# Patient Record
Sex: FEMALE | Race: WHITE | Employment: OTHER | URBAN - METROPOLITAN AREA
[De-identification: names, ages, dates, MRNs, and addresses within clinical notes are randomized per-mention and may not be internally consistent; named-entity substitution may affect disease eponyms.]

---

## 2017-01-19 PROBLEM — H35.033 HYPERTENSIVE RETINOPATHY OF BOTH EYES, GRADE 1: Status: ACTIVE | Noted: 2017-01-19

## 2017-01-19 PROBLEM — H35.3222 EXUDATIVE AGE-RELATED MACULAR DEGENERATION OF LEFT EYE WITH INACTIVE CHOROIDAL NEOVASCULARIZATION (HCC): Status: ACTIVE | Noted: 2017-01-19

## 2017-01-23 PROBLEM — I51.89 LEFT VENTRICULAR DIASTOLIC DYSFUNCTION WITH PRESERVED SYSTOLIC FUNCTION: Status: ACTIVE | Noted: 2017-01-23

## 2017-01-31 ENCOUNTER — HOSPITAL ENCOUNTER (OUTPATIENT)
Dept: ULTRASOUND IMAGING | Facility: HOSPITAL | Age: 82
Discharge: HOME | End: 2017-01-31
Attending: INTERNAL MEDICINE
Payer: MEDICARE

## 2017-01-31 ENCOUNTER — HOSPITAL ENCOUNTER (OUTPATIENT)
Dept: GENERAL RADIOLOGY | Facility: HOSPITAL | Age: 82
Discharge: HOME | End: 2017-01-31
Attending: INTERNAL MEDICINE
Payer: MEDICARE

## 2017-01-31 VITALS
DIASTOLIC BLOOD PRESSURE: 91 MMHG | SYSTOLIC BLOOD PRESSURE: 163 MMHG | RESPIRATION RATE: 20 BRPM | HEART RATE: 73 BPM | OXYGEN SATURATION: 95 %

## 2017-01-31 DIAGNOSIS — J90 PLEURAL EFFUSION: ICD-10-CM

## 2017-01-31 DIAGNOSIS — J90 PLEURAL EFFUSION, RIGHT: ICD-10-CM

## 2017-01-31 LAB
LDH PLEURAL FLUID: 140 U/L
PROT FLD-MCNC: 5.1 G/DL

## 2017-01-31 PROCEDURE — 71010 XR CHEST AP/PA (1 VIEW) (CPT=71010): CPT

## 2017-01-31 PROCEDURE — 84311 SPECTROPHOTOMETRY: CPT

## 2017-01-31 PROCEDURE — 88112 CYTOPATH CELL ENHANCE TECH: CPT

## 2017-01-31 PROCEDURE — 88342 IMHCHEM/IMCYTCHM 1ST ANTB: CPT

## 2017-01-31 PROCEDURE — 89050 BODY FLUID CELL COUNT: CPT

## 2017-01-31 PROCEDURE — 88341 IMHCHEM/IMCYTCHM EA ADD ANTB: CPT

## 2017-01-31 PROCEDURE — 83615 LACTATE (LD) (LDH) ENZYME: CPT | Performed by: INTERNAL MEDICINE

## 2017-01-31 PROCEDURE — 88305 TISSUE EXAM BY PATHOLOGIST: CPT

## 2017-01-31 PROCEDURE — 87205 SMEAR GRAM STAIN: CPT

## 2017-01-31 PROCEDURE — 89051 BODY FLUID CELL COUNT: CPT

## 2017-01-31 PROCEDURE — 84157 ASSAY OF PROTEIN OTHER: CPT

## 2017-01-31 PROCEDURE — 87070 CULTURE OTHR SPECIMN AEROBIC: CPT

## 2017-01-31 PROCEDURE — 82570 ASSAY OF URINE CREATININE: CPT

## 2017-01-31 PROCEDURE — 32555 ASPIRATE PLEURA W/ IMAGING: CPT | Performed by: RADIOLOGY

## 2017-01-31 PROCEDURE — 88160 CYTOPATH SMEAR OTHER SOURCE: CPT

## 2017-01-31 NOTE — IMAGING NOTE
1315: PT PRESENTS WITH HER DAUGHTER-IN-LAW FOR ULTRASOUND GUIDED RIGHT THORACENTESIS. ASSISTED PT ONTO CART. PT IS ALERT AND ORIENTED. VITAL SIGNS STABLE.    CONSENT SIGNED   DR Chaka Bond PAGED VIA \"PAULINA\" AT [address] [address] [facility]

## 2017-02-01 LAB
BASOPHILS NFR FLD: 0 %
COLOR FLD: YELLOW
EOSINOPHIL NFR FLD: 0 %
LYMPHOCYTES NFR FLD: 21 %
MONOCYTES NFR FLD: 77 %
NEUTROPHILS NFR FLD: 1 %
RBC # FLD: 233 /CUMM (ref ?–1)
WBC # FLD: 833 /CUMM (ref ?–1)
WBC OTHER NFR FLD: 1 %

## 2017-02-02 PROBLEM — C45.0 MALIGNANT PLEURAL MESOTHELIOMA (HCC): Status: ACTIVE | Noted: 2017-02-02

## 2017-02-02 LAB — ADENOSINE DEAMINASE, PLEURAL FLUID: 2.5 U/L

## 2017-02-03 ENCOUNTER — HOSPITAL ENCOUNTER (OUTPATIENT)
Dept: NUCLEAR MEDICINE | Facility: HOSPITAL | Age: 82
Discharge: HOME | End: 2017-02-03
Attending: INTERNAL MEDICINE
Payer: MEDICARE

## 2017-02-03 DIAGNOSIS — C45.0 MESOTHELIOMA (PLEURAL) (HCC): ICD-10-CM

## 2017-02-03 LAB — GLUCOSE BLDC GLUCOMTR-MCNC: 111 MG/DL (ref 70–99)

## 2017-02-03 PROCEDURE — 82962 GLUCOSE BLOOD TEST: CPT

## 2017-02-03 PROCEDURE — 78815 PET IMAGE W/CT SKULL-THIGH: CPT

## 2017-02-07 PROBLEM — I25.10 CORONARY ARTERY CALCIFICATION: Status: ACTIVE | Noted: 2017-02-07

## 2017-02-07 PROBLEM — I25.84 CORONARY ARTERY CALCIFICATION: Status: ACTIVE | Noted: 2017-02-07

## 2017-02-21 ENCOUNTER — LAB ENCOUNTER (OUTPATIENT)
Dept: LAB | Age: 82
End: 2017-02-21
Attending: SURGERY
Payer: MEDICARE

## 2017-02-21 DIAGNOSIS — J90 EXUDATIVE PLEURISY: Primary | ICD-10-CM

## 2017-02-21 LAB
ANTIBODY SCREEN: NEGATIVE
RH BLOOD TYPE: POSITIVE

## 2017-02-21 PROCEDURE — 86850 RBC ANTIBODY SCREEN: CPT

## 2017-02-21 PROCEDURE — 86901 BLOOD TYPING SEROLOGIC RH(D): CPT

## 2017-02-21 PROCEDURE — 86900 BLOOD TYPING SEROLOGIC ABO: CPT

## 2017-03-07 ENCOUNTER — ANESTHESIA EVENT (OUTPATIENT)
Dept: CARDIAC SURGERY | Facility: HOSPITAL | Age: 82
DRG: 164 | End: 2017-03-07
Payer: MEDICARE

## 2017-03-08 ENCOUNTER — SURGERY (OUTPATIENT)
Age: 82
End: 2017-03-08

## 2017-03-08 ENCOUNTER — APPOINTMENT (OUTPATIENT)
Dept: GENERAL RADIOLOGY | Facility: HOSPITAL | Age: 82
DRG: 164 | End: 2017-03-08
Attending: SURGERY
Payer: MEDICARE

## 2017-03-08 ENCOUNTER — HOSPITAL ENCOUNTER (INPATIENT)
Facility: HOSPITAL | Age: 82
LOS: 4 days | Discharge: HOME HEALTH | DRG: 164 | End: 2017-03-12
Attending: SURGERY | Admitting: SURGERY
Payer: MEDICARE

## 2017-03-08 ENCOUNTER — ANESTHESIA (OUTPATIENT)
Dept: CARDIAC SURGERY | Facility: HOSPITAL | Age: 82
DRG: 164 | End: 2017-03-08
Payer: MEDICARE

## 2017-03-08 DIAGNOSIS — J90 PLEURAL EFFUSION, RIGHT: ICD-10-CM

## 2017-03-08 DIAGNOSIS — R94.30 CARDIAC LV EJECTION FRACTION >40%: ICD-10-CM

## 2017-03-08 DIAGNOSIS — Z98.61 S/P PTCA (PERCUTANEOUS TRANSLUMINAL CORONARY ANGIOPLASTY): ICD-10-CM

## 2017-03-08 DIAGNOSIS — Z96.1 PSEUDOPHAKIA: ICD-10-CM

## 2017-03-08 DIAGNOSIS — H35.033 HYPERTENSIVE RETINOPATHY OF BOTH EYES, GRADE 1: ICD-10-CM

## 2017-03-08 DIAGNOSIS — I25.84 CORONARY ARTERY CALCIFICATION: ICD-10-CM

## 2017-03-08 DIAGNOSIS — Z01.818 PREOP TESTING: ICD-10-CM

## 2017-03-08 DIAGNOSIS — H43.813 PVD (POSTERIOR VITREOUS DETACHMENT), BILATERAL: ICD-10-CM

## 2017-03-08 DIAGNOSIS — E78.2 MIXED HYPERLIPIDEMIA: Primary | ICD-10-CM

## 2017-03-08 DIAGNOSIS — H35.3222 EXUDATIVE AGE-RELATED MACULAR DEGENERATION OF LEFT EYE WITH INACTIVE CHOROIDAL NEOVASCULARIZATION (HCC): ICD-10-CM

## 2017-03-08 DIAGNOSIS — H90.3 BILATERAL SENSORINEURAL HEARING LOSS: ICD-10-CM

## 2017-03-08 DIAGNOSIS — IMO0001 AORTIC SCLEROSIS: ICD-10-CM

## 2017-03-08 DIAGNOSIS — J45.30 MILD PERSISTENT ASTHMA WITHOUT COMPLICATION: ICD-10-CM

## 2017-03-08 DIAGNOSIS — H93.13 TINNITUS, BILATERAL: ICD-10-CM

## 2017-03-08 DIAGNOSIS — I51.89 LEFT VENTRICULAR DIASTOLIC DYSFUNCTION WITH PRESERVED SYSTOLIC FUNCTION: ICD-10-CM

## 2017-03-08 DIAGNOSIS — I25.10 CORONARY ARTERY CALCIFICATION: ICD-10-CM

## 2017-03-08 DIAGNOSIS — Z95.5 PRESENCE OF BARE METAL STENT IN LAD CORONARY ARTERY: ICD-10-CM

## 2017-03-08 DIAGNOSIS — C45.0 MALIGNANT PLEURAL MESOTHELIOMA (HCC): ICD-10-CM

## 2017-03-08 DIAGNOSIS — I25.10 ATHEROSCLEROSIS OF NATIVE CORONARY ARTERY OF NATIVE HEART WITHOUT ANGINA PECTORIS: ICD-10-CM

## 2017-03-08 DIAGNOSIS — G47.33 OSA (OBSTRUCTIVE SLEEP APNEA): ICD-10-CM

## 2017-03-08 DIAGNOSIS — R42 DIZZINESS: ICD-10-CM

## 2017-03-08 DIAGNOSIS — Z66 DNR (DO NOT RESUSCITATE): ICD-10-CM

## 2017-03-08 DIAGNOSIS — R91.1 LUNG NODULE: ICD-10-CM

## 2017-03-08 PROBLEM — C45.9 MESOTHELIOMA (HCC): Status: ACTIVE | Noted: 2017-03-08

## 2017-03-08 PROBLEM — Z91.81 AT RISK FOR FALLING: Status: ACTIVE | Noted: 2017-03-08

## 2017-03-08 LAB
ALBUMIN SERPL-MCNC: 3.7 G/DL (ref 3.5–4.8)
ALP LIVER SERPL-CCNC: 125 U/L (ref 55–142)
ALT SERPL-CCNC: 22 U/L (ref 14–54)
ANTIBODY SCREEN: NEGATIVE
AST SERPL-CCNC: 25 U/L (ref 15–41)
ATRIAL RATE: 63 BPM
BILIRUB SERPL-MCNC: 0.4 MG/DL (ref 0.1–2)
BUN BLD-MCNC: 20 MG/DL (ref 8–20)
CALCIUM BLD-MCNC: 9.7 MG/DL (ref 8.3–10.3)
CHLORIDE: 100 MMOL/L (ref 101–111)
CO2: 33 MMOL/L (ref 22–32)
CREAT BLD-MCNC: 0.72 MG/DL (ref 0.55–1.02)
GLUCOSE BLD-MCNC: 110 MG/DL (ref 65–99)
GLUCOSE BLD-MCNC: 92 MG/DL (ref 70–99)
ISTAT ACTIVATED CLOTTING TIME: 147 SECONDS (ref 74–137)
ISTAT BLOOD GAS BASE EXCESS: 3 MMOL/L
ISTAT BLOOD GAS HCO3: 27.1 MEQ/L (ref 22–26)
ISTAT BLOOD GAS O2 SATURATION: 98 % (ref 92–100)
ISTAT BLOOD GAS PCO2: 42 MMHG (ref 35–45)
ISTAT BLOOD GAS PH: 7.41 (ref 7.35–7.45)
ISTAT BLOOD GAS PO2: 99 MMHG (ref 80–105)
ISTAT BLOOD GAS TCO2: 28 MMOL/L (ref 22–32)
ISTAT HEMATOCRIT: 37 % (ref 37–54)
ISTAT IONIZED CALCIUM: 1.17 MMOL/L (ref 1.12–1.32)
ISTAT POTASSIUM: 3.1 MMOL/L (ref 3.6–5.1)
ISTAT SODIUM: 141 MMOL/L (ref 136–144)
M PROTEIN MFR SERPL ELPH: 8.2 G/DL (ref 6.1–8.3)
P AXIS: 42 DEGREES
P-R INTERVAL: 190 MS
POTASSIUM SERPL-SCNC: 3.5 MMOL/L (ref 3.6–5.1)
Q-T INTERVAL: 410 MS
QRS DURATION: 100 MS
QTC CALCULATION (BEZET): 419 MS
R AXIS: 2 DEGREES
RH BLOOD TYPE: POSITIVE
SODIUM SERPL-SCNC: 140 MMOL/L (ref 136–144)
T AXIS: 10 DEGREES
VENTRICULAR RATE: 63 BPM

## 2017-03-08 PROCEDURE — 0W9940Z DRAINAGE OF RIGHT PLEURAL CAVITY WITH DRAINAGE DEVICE, PERCUTANEOUS ENDOSCOPIC APPROACH: ICD-10-PCS | Performed by: SURGERY

## 2017-03-08 PROCEDURE — 87081 CULTURE SCREEN ONLY: CPT | Performed by: SURGERY

## 2017-03-08 PROCEDURE — S0077 INJECTION, CLINDAMYCIN PHOSP: HCPCS | Performed by: SURGERY

## 2017-03-08 PROCEDURE — 88305 TISSUE EXAM BY PATHOLOGIST: CPT | Performed by: SURGERY

## 2017-03-08 PROCEDURE — 0BBN4ZX EXCISION OF RIGHT PLEURA, PERCUTANEOUS ENDOSCOPIC APPROACH, DIAGNOSTIC: ICD-10-PCS | Performed by: SURGERY

## 2017-03-08 PROCEDURE — 85347 COAGULATION TIME ACTIVATED: CPT

## 2017-03-08 PROCEDURE — 02BN4ZX EXCISION OF PERICARDIUM, PERCUTANEOUS ENDOSCOPIC APPROACH, DIAGNOSTIC: ICD-10-PCS | Performed by: SURGERY

## 2017-03-08 PROCEDURE — 93005 ELECTROCARDIOGRAM TRACING: CPT

## 2017-03-08 PROCEDURE — 80053 COMPREHEN METABOLIC PANEL: CPT | Performed by: SURGERY

## 2017-03-08 PROCEDURE — 88341 IMHCHEM/IMCYTCHM EA ADD ANTB: CPT | Performed by: SURGERY

## 2017-03-08 PROCEDURE — 71010 XR CHEST AP PORTABLE  (CPT=71010): CPT

## 2017-03-08 PROCEDURE — 3E0L3GC INTRODUCTION OF OTHER THERAPEUTIC SUBSTANCE INTO PLEURAL CAVITY, PERCUTANEOUS APPROACH: ICD-10-PCS | Performed by: SURGERY

## 2017-03-08 PROCEDURE — 86850 RBC ANTIBODY SCREEN: CPT | Performed by: SURGERY

## 2017-03-08 PROCEDURE — 84132 ASSAY OF SERUM POTASSIUM: CPT

## 2017-03-08 PROCEDURE — 85014 HEMATOCRIT: CPT

## 2017-03-08 PROCEDURE — 86920 COMPATIBILITY TEST SPIN: CPT

## 2017-03-08 PROCEDURE — 82803 BLOOD GASES ANY COMBINATION: CPT

## 2017-03-08 PROCEDURE — 93010 ELECTROCARDIOGRAM REPORT: CPT | Performed by: INTERNAL MEDICINE

## 2017-03-08 PROCEDURE — 86900 BLOOD TYPING SEROLOGIC ABO: CPT | Performed by: SURGERY

## 2017-03-08 PROCEDURE — 86901 BLOOD TYPING SEROLOGIC RH(D): CPT | Performed by: SURGERY

## 2017-03-08 PROCEDURE — 82330 ASSAY OF CALCIUM: CPT

## 2017-03-08 PROCEDURE — 84295 ASSAY OF SERUM SODIUM: CPT

## 2017-03-08 PROCEDURE — 88342 IMHCHEM/IMCYTCHM 1ST ANTB: CPT | Performed by: SURGERY

## 2017-03-08 RX ORDER — NITROGLYCERIN 20 MG/100ML
INJECTION INTRAVENOUS CONTINUOUS
Status: DISCONTINUED | OUTPATIENT
Start: 2017-03-08 | End: 2017-03-12

## 2017-03-08 RX ORDER — ONDANSETRON 2 MG/ML
4 INJECTION INTRAMUSCULAR; INTRAVENOUS EVERY 6 HOURS PRN
Status: DISCONTINUED | OUTPATIENT
Start: 2017-03-08 | End: 2017-03-08

## 2017-03-08 RX ORDER — ONDANSETRON 2 MG/ML
4 INJECTION INTRAMUSCULAR; INTRAVENOUS EVERY 6 HOURS PRN
Status: DISCONTINUED | OUTPATIENT
Start: 2017-03-08 | End: 2017-03-12

## 2017-03-08 RX ORDER — METOCLOPRAMIDE HYDROCHLORIDE 5 MG/ML
10 INJECTION INTRAMUSCULAR; INTRAVENOUS AS NEEDED
Status: ACTIVE | OUTPATIENT
Start: 2017-03-08 | End: 2017-03-08

## 2017-03-08 RX ORDER — ACETAMINOPHEN 10 MG/ML
1000 INJECTION, SOLUTION INTRAVENOUS EVERY 6 HOURS PRN
Status: DISCONTINUED | OUTPATIENT
Start: 2017-03-08 | End: 2017-03-08

## 2017-03-08 RX ORDER — ONDANSETRON 2 MG/ML
4 INJECTION INTRAMUSCULAR; INTRAVENOUS AS NEEDED
Status: ACTIVE | OUTPATIENT
Start: 2017-03-08 | End: 2017-03-08

## 2017-03-08 RX ORDER — POLYETHYLENE GLYCOL 3350 17 G/17G
17 POWDER, FOR SOLUTION ORAL DAILY PRN
Status: DISCONTINUED | OUTPATIENT
Start: 2017-03-08 | End: 2017-03-12

## 2017-03-08 RX ORDER — BISACODYL 10 MG
10 SUPPOSITORY, RECTAL RECTAL
Status: DISCONTINUED | OUTPATIENT
Start: 2017-03-08 | End: 2017-03-12

## 2017-03-08 RX ORDER — SODIUM CHLORIDE 9 MG/ML
INJECTION, SOLUTION INTRAVENOUS ONCE
Status: DISCONTINUED | OUTPATIENT
Start: 2017-03-08 | End: 2017-03-08

## 2017-03-08 RX ORDER — SODIUM CHLORIDE 9 MG/ML
INJECTION, SOLUTION INTRAVENOUS CONTINUOUS
Status: DISCONTINUED | OUTPATIENT
Start: 2017-03-08 | End: 2017-03-12

## 2017-03-08 RX ORDER — DIPHENHYDRAMINE HYDROCHLORIDE 50 MG/ML
12.5 INJECTION INTRAMUSCULAR; INTRAVENOUS EVERY 4 HOURS PRN
Status: DISCONTINUED | OUTPATIENT
Start: 2017-03-08 | End: 2017-03-12

## 2017-03-08 RX ORDER — NITROGLYCERIN 20 MG/100ML
INJECTION INTRAVENOUS
Status: COMPLETED
Start: 2017-03-08 | End: 2017-03-08

## 2017-03-08 RX ORDER — HYDROMORPHONE HYDROCHLORIDE 1 MG/ML
0.5 INJECTION, SOLUTION INTRAMUSCULAR; INTRAVENOUS; SUBCUTANEOUS EVERY 5 MIN PRN
Status: ACTIVE | OUTPATIENT
Start: 2017-03-08 | End: 2017-03-08

## 2017-03-08 RX ORDER — CLINDAMYCIN PHOSPHATE 600 MG/50ML
600 INJECTION INTRAVENOUS EVERY 8 HOURS
Status: COMPLETED | OUTPATIENT
Start: 2017-03-08 | End: 2017-03-09

## 2017-03-08 RX ORDER — DOCUSATE SODIUM 100 MG/1
100 CAPSULE, LIQUID FILLED ORAL 2 TIMES DAILY
Status: DISCONTINUED | OUTPATIENT
Start: 2017-03-08 | End: 2017-03-12

## 2017-03-08 RX ORDER — NALOXONE HYDROCHLORIDE 0.4 MG/ML
0.08 INJECTION, SOLUTION INTRAMUSCULAR; INTRAVENOUS; SUBCUTANEOUS
Status: DISCONTINUED | OUTPATIENT
Start: 2017-03-08 | End: 2017-03-12

## 2017-03-08 RX ORDER — CLINDAMYCIN PHOSPHATE 900 MG/50ML
INJECTION INTRAVENOUS
Status: DISCONTINUED | OUTPATIENT
Start: 2017-03-08 | End: 2017-03-09 | Stop reason: ALTCHOICE

## 2017-03-08 RX ORDER — ACETAMINOPHEN 10 MG/ML
1000 INJECTION, SOLUTION INTRAVENOUS EVERY 6 HOURS PRN
Status: DISPENSED | OUTPATIENT
Start: 2017-03-08 | End: 2017-03-10

## 2017-03-08 RX ORDER — ACETAMINOPHEN 10 MG/ML
INJECTION, SOLUTION INTRAVENOUS
Status: DISCONTINUED | OUTPATIENT
Start: 2017-03-08 | End: 2017-03-08

## 2017-03-08 RX ORDER — NALOXONE HYDROCHLORIDE 0.4 MG/ML
80 INJECTION, SOLUTION INTRAMUSCULAR; INTRAVENOUS; SUBCUTANEOUS AS NEEDED
Status: ACTIVE | OUTPATIENT
Start: 2017-03-08 | End: 2017-03-08

## 2017-03-08 RX ORDER — NALBUPHINE HCL 10 MG/ML
2.5 AMPUL (ML) INJECTION EVERY 4 HOURS PRN
Status: DISCONTINUED | OUTPATIENT
Start: 2017-03-08 | End: 2017-03-12

## 2017-03-08 RX ORDER — SODIUM PHOSPHATE, DIBASIC AND SODIUM PHOSPHATE, MONOBASIC 7; 19 G/133ML; G/133ML
1 ENEMA RECTAL ONCE AS NEEDED
Status: ACTIVE | OUTPATIENT
Start: 2017-03-08 | End: 2017-03-08

## 2017-03-08 RX ORDER — SODIUM CHLORIDE, SODIUM LACTATE, POTASSIUM CHLORIDE, CALCIUM CHLORIDE 600; 310; 30; 20 MG/100ML; MG/100ML; MG/100ML; MG/100ML
INJECTION, SOLUTION INTRAVENOUS CONTINUOUS
Status: DISCONTINUED | OUTPATIENT
Start: 2017-03-08 | End: 2017-03-12

## 2017-03-08 NOTE — CONSULTS
[facility] Hospitalist H&P       CC:  Medicine consult for comanagement of medical conditions     PCP: Lucia Byrne MD    History of Present Illness: Patient is a 80year old female with PMH sig for CAD, HTN, HL, asthma, YOSVANY and mesothelioma is admitted for - CARDIO (EXTERNAL)  feb 2014 gsh    Comment EF 35% 3 x 26 mm bare metal Integrity to LAD for 99% lesion. +1 MR. Diffuse circ plaquing max 60%. RCA multiple 80 - 95%. She has in stent restenosis. Transiently had VFib - given Amiodarone.  Needs RCA interv enlargment/tenderness/nodules appreciated   Lungs:   Diminished breath sounds R lung. Normal effort   Chest wall:  No tenderness or deformity.    Heart:  Regular rate and rhythm, S1, S2 normal, no murmur, rub or gallop appreciated   Abdomen:   Soft, non-ten exacerbation  -not on meds      dispo - ICU monitoring    Outpatient records reviewed confirming patient's medical history and medications. Further recommendations pending patient's clinical course.   [facility] hospitalist to continue to follow patient while

## 2017-03-08 NOTE — CONSULTS
Diane Watkins 666 Patient Status:  Inpatient    1932 MRN [id number]   [facility] 6NE-A Attending Yamel Nolasco, Vincent Bello MD   Hosp Day # 0 PCP Octavio Poole MD     Date of Admission: 3/8/2017  Admission Diagnosis: MA Chaim Castleman at [facility] (EXTERNAL)  feb 2014 gsh    Comment EF 35% 3 x 26 mm bare metal Integrity to LAD for 99% lesion. +1 MR. Diffuse circ plaquing max 60%. RCA multiple 80 - 95%.  She has in stent restenos infusion, , , PRN  •  0.9%  NaCl infusion, , Intravenous, Continuous  •  docusate sodium (COLACE) cap 100 mg, 100 mg, Oral, BID  •  PEG 3350 (MIRALAX) powder packet 17 g, 17 g, Oral, Daily PRN  •  magnesium hydroxide (MILK OF MAGNESIA) 400 MG/5ML suspensio diminished breath sounds base - right  Heart: S1, S2 normal, no murmur, click, rub or gallop, regular rate and rhythm  Abdomen: soft, non-tender; bowel sounds normal; no masses,  no organomegaly  Extremities: extremities normal, atraumatic, no cyanosis or

## 2017-03-08 NOTE — ANESTHESIA POSTPROCEDURE EVALUATION
Diane Watkins 799 Patient Status:  Inpatient   Age/Gender 80year old female MRN [id number]   [facility] 6NE-A Attending Yamel Nolasco, Vincent Bello MD   [facility] Day # 0 PCP Octavio Poole MD       Anesthesia Post-op Note    Procedu

## 2017-03-08 NOTE — PROGRESS NOTES
Pre Op Dx:  Recurrent Malignant Right Pleural Effusion                     Mesothelioma    Post Op Dx: Recurrent Malignant Right Pleural Effusion                     Mesothelioma    Procedure: Right VATs                   Drainage Right Pleural Effusion 60

## 2017-03-08 NOTE — H&P
[facility]    PATIENT'S NAME: Nida NARAYANAN   ATTENDING PHYSICIAN: Rae Peñaloza M.D.    PATIENT ACCOUNT#:   [de-identified]    LOCATION:  [facility] 1 [facility] 10  MEDICAL RECORD #:   [id number]       YOB: 1932  ADMISSION DATE:       03/ She does not claudicate when she ambulates. She has had no lower extremity edema. No history of nonhealing wounds or hyperpigmented skin lesions. No headaches, seizure activity, syncopal events or transient ischemic attacks.   No significant change in mo from the effusion. We have discussed options of PleurX catheter placement versus VATS pleurodesis. The patient is not in favor of PleurX catheter placement as she does not want to manage the catheter or have a catheter exiting her body.   I have explained

## 2017-03-08 NOTE — PROGRESS NOTES
03/08/17 1134   Clinical Encounter Type   Visited With Patient and family together  (son and daughter-in-law)   Routine Visit (Responded to patient's call)   Continue Visiting Yes  (Encouraged to call anytime for support)   Surgical Visit Pre-op   Chapl

## 2017-03-08 NOTE — ANESTHESIA PREPROCEDURE EVALUATION
PRE-OP EVALUATION    Patient Name: Phoebe Epp    Pre-op Diagnosis: MALIGNANT RIGHT PLEURAL EFFUSION, [facility]      Procedure(s):  RIGHT VATS, DRAIN RIGHT PLEURAL EFFUSION, BX PLEURA, TALC PLEURODESIS     Surgeon(s) and Role:     * Yamel Nolasco, Cha Betancur Neuro/Psych                              EF 45%          Past Surgical History    ANGIOPLASTY (CORONARY)      Comment right circumflex     CATARACT      Comment left with implant    HYSTERECTOMY      Comment tahbso for dub/anemia          Comment x general  NPO status verified and patient meets guidelines. Comment: GA with double lumen ETT and arterial line with epidural for postoperative pain. Risk and benefits discussed and questions answered.     Plan/risks discussed with: patient and child/

## 2017-03-09 ENCOUNTER — APPOINTMENT (OUTPATIENT)
Dept: GENERAL RADIOLOGY | Facility: HOSPITAL | Age: 82
DRG: 164 | End: 2017-03-09
Attending: SURGERY
Payer: MEDICARE

## 2017-03-09 LAB
APTT PPP: 27.9 SECONDS (ref 25–34)
BUN BLD-MCNC: 21 MG/DL (ref 8–20)
CALCIUM BLD-MCNC: 8.7 MG/DL (ref 8.3–10.3)
CHLORIDE: 104 MMOL/L (ref 101–111)
CO2: 28 MMOL/L (ref 22–32)
CREAT BLD-MCNC: 0.77 MG/DL (ref 0.55–1.02)
ERYTHROCYTE [DISTWIDTH] IN BLOOD BY AUTOMATED COUNT: 13.9 % (ref 11.5–16)
GLUCOSE BLD-MCNC: 162 MG/DL (ref 70–99)
HCT VFR BLD AUTO: 38.5 % (ref 34–50)
HGB BLD-MCNC: 12.6 G/DL (ref 12–16)
INR BLD: 0.97 (ref 0.89–1.11)
MCH RBC QN AUTO: 30.5 PG (ref 27–33.2)
MCHC RBC AUTO-ENTMCNC: 32.7 G/DL (ref 31–37)
MCV RBC AUTO: 93.2 FL (ref 81–100)
PLATELET # BLD AUTO: 241 10(3)UL (ref 150–450)
POTASSIUM SERPL-SCNC: 3.9 MMOL/L (ref 3.6–5.1)
PSA SERPL DL<=0.01 NG/ML-MCNC: 12.9 SECONDS (ref 12–14.3)
RBC # BLD AUTO: 4.13 X10(6)UL (ref 3.8–5.1)
RED CELL DISTRIBUTION WIDTH-SD: 47.6 FL (ref 35.1–46.3)
SODIUM SERPL-SCNC: 142 MMOL/L (ref 136–144)
WBC # BLD AUTO: 19.2 X10(3) UL (ref 4–13)

## 2017-03-09 PROCEDURE — 71010 XR CHEST AP PORTABLE  (CPT=71010): CPT

## 2017-03-09 PROCEDURE — 85610 PROTHROMBIN TIME: CPT | Performed by: SURGERY

## 2017-03-09 PROCEDURE — 80048 BASIC METABOLIC PNL TOTAL CA: CPT | Performed by: SURGERY

## 2017-03-09 PROCEDURE — S0077 INJECTION, CLINDAMYCIN PHOSP: HCPCS | Performed by: SURGERY

## 2017-03-09 PROCEDURE — 85027 COMPLETE CBC AUTOMATED: CPT | Performed by: SURGERY

## 2017-03-09 PROCEDURE — 85730 THROMBOPLASTIN TIME PARTIAL: CPT | Performed by: SURGERY

## 2017-03-09 PROCEDURE — 97162 PT EVAL MOD COMPLEX 30 MIN: CPT

## 2017-03-09 RX ORDER — HEPARIN SODIUM 5000 [USP'U]/ML
5000 INJECTION, SOLUTION INTRAVENOUS; SUBCUTANEOUS EVERY 12 HOURS SCHEDULED
Status: DISCONTINUED | OUTPATIENT
Start: 2017-03-09 | End: 2017-03-12

## 2017-03-09 RX ORDER — POTASSIUM CHLORIDE 20 MEQ/1
40 TABLET, EXTENDED RELEASE ORAL EVERY 4 HOURS
Status: DISCONTINUED | OUTPATIENT
Start: 2017-03-09 | End: 2017-03-09

## 2017-03-09 NOTE — PROGRESS NOTES
Diane Watkins 766 Patient Status:  Inpatient    1932 MRN [id number]   [facility] 6NE-A Attending Yamel Nolasco, Cha Betancur MD   Hosp Day # 1 PCP Lucia Byrne MD     SUBJECTIVE: c/o some nausea this morning.  Pain is co insp crackles, no wheezing  Heart: regular rate and rhythm  Abdomen: soft, non-tender; bowel sounds normal; no masses,  no organomegaly  Extremities: extremities normal, atraumatic, no cyanosis or edema       Lab Results  Component Value Date   WBC 19.2 03

## 2017-03-09 NOTE — PAYOR COMM NOTE
Attending Physician: Wero Moreland MD    Review Type: ADMISSION   Reviewer: Jl Currie       Date: March 9, 2017 - 8:57 AM  Payor: [address] [place] [address]  Authorization Number: [id number]  Admit date: 3/8/2017  9:33 AM       REVIEWER COMMENTS Date Action Dose Route User    3/8/2017 2000 Rate/Dose Verify 10 mcg/min Intravenous Gabriella Polk RN    3/8/2017 1800 Rate/Dose Change 10 mcg/min Intravenous Airam Snider, RN    3/8/2017 1400 New Bag 5 mcg/min Intravenous Airam Snider, RN

## 2017-03-09 NOTE — PHYSICAL THERAPY NOTE
PHYSICAL THERAPY EVALUATION - INPATIENT     Room Number: 0584/4277-A  Evaluation Date: 3/9/2017  Type of Evaluation: Initial  Physician Order: PT Eval and Treat    Presenting Problem: s/p VATS and pleruadesis  Reason for Therapy: Mobility Dysfunction a Janelle CRAIG at [facility]  6/20/2011    Comment Performed by Pernell Felder at [facility] (EXTERNAL)  feb 2014 gsh    Comment EF 35% 3 x 26 mm bare metal Integrity to LAD for 99% l have...  -   Turning over in bed (including adjusting bedclothes, sheets and blankets)?: A Little   -   Sitting down on and standing up from a chair with arms (e.g., wheelchair, bedside commode, etc.): A Little   -   Moving from lying on back to sitting on patient presents with the following impairments: decreased balance, decreased B LE strength, decreased activity tolerance, increased SOB and decreased gait endurance.   These impairments manifest themselves as functional limitations in transfers, bed mobili

## 2017-03-09 NOTE — PROGRESS NOTES
Acute Pain Service    Continuous Epidural Infusion Follow-up Note    Indication: Post Surgical and Chest tube. SUBJECTIVE:    Pt states she has \"no pain\"    OBJECTIVE:    Pain Score:    0/ at rest    0/ with movement    Patient is comfortable.

## 2017-03-09 NOTE — PROGRESS NOTES
03/09/17 0027   Clinical Encounter Type   Visited With Patient and family together   Referral From Family   Referral To [address] Needs Prayer  (Prayers of Thanksgiving for successful surgery)   Patient Spiritual Encoun

## 2017-03-09 NOTE — PLAN OF CARE
CARDIOVASCULAR - ADULT    • Maintains optimal cardiac output and hemodynamic stability Progressing        Impaired Functional Mobility    • Achieve highest/safest level of mobility/gait Progressing        RESPIRATORY - ADULT    • Achieves optimal ventilati

## 2017-03-09 NOTE — PROGRESS NOTES
03/09/17 1003   Clinical Encounter Type   Visited With Patient   Sacramental Encounters   Sacrament of Sick-Anointing Visiting  anointed patient

## 2017-03-09 NOTE — PHYSICAL THERAPY NOTE
Order received for PT eval. Attempted to see Pt this am, however, Pt just returned to bed. Will follow up later, as schedule permits.

## 2017-03-09 NOTE — PROGRESS NOTES
[facility]  Progress Note    Greg Singh Patient Status:  Inpatient    1932 MRN [id number]   [facility] 6NE-A Attending Yamel Nolasco, Andreia Nolasco MD   Hosp Day # 1 PCP Mandy Gore MD     Subjective:  Pt with some nausea this resp. rate 16, height 5' 2\" (1.575 m), weight 154 lb 1.6 oz (69.9 kg), SpO2 98 %.   General: A&O X 3, VSS, NAD, afeb  Neck: no JVD  Lungs: CTAB, Pleurevac w/o air leak  Heart: RRR    Assessment/Plan: S/P Rt VATS POD #1   - HD stable off NTG   - CT mgmt - t

## 2017-03-09 NOTE — OPERATIVE REPORT
[facility]    PATIENT'S NAME: Luisa NARAYANAN   ATTENDING PHYSICIAN: Loreto Pleitez M.D. OPERATING PHYSICIAN: Loreto Pleitez M.D.    PATIENT ACCOUNT#:   [de-identified]    LOCATION:  [address] [place] [address]  MEDICAL RECORD #:   [id number]       DATE OF BIRTH: of the right hemithorax was performed. There was diffuse malignant-appearing tumor on the pleura. There was diffuse malignant-appearing tumor on the pericardium. There was diffuse malignant-appearing tumor on the diaphragm.   There was also diffuse malig

## 2017-03-09 NOTE — PLAN OF CARE
Assumed pt care at 0730. Received pt in the chair. Alert and oriented x4. NSR on Tele. 2L NC. Pt with PCEA in place with c/o nausea/vomitting. PCEA switched to Bupivicaine only.  Chest tube noted with no air leak, off suction and to water seal. Pt encourage

## 2017-03-09 NOTE — CM/SW NOTE
03/09/17 1200   CM/SW Referral Data   Referral Source Social Work (self-referral)   Reason for Referral Discharge planning   Informant Patient; Children   Pertinent Medical Hx   Primary Care Physician Name [facility]   Patient Info   Patient's Mental Status

## 2017-03-10 ENCOUNTER — APPOINTMENT (OUTPATIENT)
Dept: GENERAL RADIOLOGY | Facility: HOSPITAL | Age: 82
DRG: 164 | End: 2017-03-10
Attending: PHYSICIAN ASSISTANT
Payer: MEDICARE

## 2017-03-10 PROCEDURE — 71010 XR CHEST AP PORTABLE  (CPT=71010): CPT

## 2017-03-10 NOTE — PROGRESS NOTES
Acute Pain Service    Continuous Epidural Infusion Follow-up Note    Indication: Post Surgical and Chest tube.       SUBJECTIVE:  Pt states pain is well managed with PCEA      OBJECTIVE:    Pain Score:    0/ at rest    0/ with movement    Patient is comf

## 2017-03-10 NOTE — PROGRESS NOTES
[facility]  Progress Note    Julián Malik Patient Status:  Inpatient    1932 MRN [id number]   [facility] 8NE-A Attending Yamel Nolasco, Jaylin Cancer, MD   Hosp Day # 2 PCP Annie Rose MD     Subjective:  Pt states pain is contro

## 2017-03-10 NOTE — PROGRESS NOTES
Pulmonary Progress Note      NAME: [address]: 5410/2945-T - MRN: [id number] - Age: 80year old - : 1932    Assessment/Plan:  1.  Dyspnea/Hypoxia- due to surgery/atelectasis in the setting of underlying asthma without acute exacerbation 32.7   RDW  13.9   WBC  19.2*   PLT  241.0     Recent Labs   Lab  03/08/17   0943  03/09/17   0435   GLU  92  162*   BUN  20  21*   CREATSERUM  0.72  0.77   CA  9.7  8.7   ALB  3.7   --    NA  140  142   K  3.5*  3.9   CL  100*  104   CO2  33.0*  28.0   AL

## 2017-03-10 NOTE — PLAN OF CARE
Post op day #2 from right VATS. CT to water seal draining small amts  serosanguinous fluid. Will remain in for today. Small amt SQ emphysema around dressing No air leak. Dressing to incision and CT site C/D/I. Denies pain. Bupivicaine PCEA in place.  Tele S

## 2017-03-10 NOTE — PROGRESS NOTES
[facility] Hospitalist Progress Note     PCP: Maritza Meza MD    SUBJECTIVE:  No CP, SOB, or N/V. Pt does have productive cough.     OBJECTIVE:  Temp:  [97.9 °F (36.6 °C)-98.6 °F (37 °C)] 98.1 °F (36.7 °C)  Pulse:  [] 74  Resp:  [18-30] 18  BP: (87-14 bupivacaine 0.1% in  ml epidural     • sodium chloride 75 mL/hr at 03/08/17 2000   • lactated ringers     • Nitroglycerin in D5W Stopped (03/09/17 0300)     ondansetron HCl, Naloxone HCl, DiphenhydrAMINE HCl, Nalbuphine HCl, PEG 3350, magnesium hydro

## 2017-03-10 NOTE — PLAN OF CARE
Problem: CARDIOVASCULAR - ADULT  Goal: Maintains optimal cardiac output and hemodynamic stability  INTERVENTIONS:  - Monitor vital signs, rhythm, and trends  - Monitor for bleeding, hypotension and signs of decreased cardiac output  - Evaluate effectivenes for areas of redness and/or skin breakdown  - Initiate interventions, skin care algorithm/standards of care as needed   Outcome: Progressing  Goal: Incision(s), wounds(s) or drain site(s) healing without S/S of infection  INTERVENTIONS:  - Assess and docum

## 2017-03-10 NOTE — PLAN OF CARE
CARDIOVASCULAR - ADULT    • Maintains optimal cardiac output and hemodynamic stability Progressing    • Absence of cardiac arrhythmias or at baseline Progressing        Assumed care of pt at 1900. Pt A&O x4, Shanthi.  Chest tubes to water seal, no air leak pre

## 2017-03-11 ENCOUNTER — APPOINTMENT (OUTPATIENT)
Dept: GENERAL RADIOLOGY | Facility: HOSPITAL | Age: 82
DRG: 164 | End: 2017-03-11
Attending: SURGERY
Payer: MEDICARE

## 2017-03-11 LAB
BASOPHILS # BLD AUTO: 0.02 X10(3) UL (ref 0–0.1)
BASOPHILS NFR BLD AUTO: 0.2 %
BUN BLD-MCNC: 12 MG/DL (ref 8–20)
CALCIUM BLD-MCNC: 9.2 MG/DL (ref 8.3–10.3)
CHLORIDE: 100 MMOL/L (ref 101–111)
CO2: 34 MMOL/L (ref 22–32)
CREAT BLD-MCNC: 0.58 MG/DL (ref 0.55–1.02)
EOSINOPHIL # BLD AUTO: 0.03 X10(3) UL (ref 0–0.3)
EOSINOPHIL NFR BLD AUTO: 0.2 %
ERYTHROCYTE [DISTWIDTH] IN BLOOD BY AUTOMATED COUNT: 13.9 % (ref 11.5–16)
GLUCOSE BLD-MCNC: 128 MG/DL (ref 70–99)
HCT VFR BLD AUTO: 37 % (ref 34–50)
HGB BLD-MCNC: 12.1 G/DL (ref 12–16)
IMMATURE GRANULOCYTE COUNT: 0.08 X10(3) UL (ref 0–1)
IMMATURE GRANULOCYTE RATIO %: 0.7 %
LYMPHOCYTES # BLD AUTO: 2.02 X10(3) UL (ref 0.9–4)
LYMPHOCYTES NFR BLD AUTO: 16.8 %
MCH RBC QN AUTO: 30.3 PG (ref 27–33.2)
MCHC RBC AUTO-ENTMCNC: 32.7 G/DL (ref 31–37)
MCV RBC AUTO: 92.5 FL (ref 81–100)
MONOCYTES # BLD AUTO: 0.88 X10(3) UL (ref 0.1–0.6)
MONOCYTES NFR BLD AUTO: 7.3 %
NEUTROPHIL ABS PRELIM: 9.01 X10 (3) UL (ref 1.3–6.7)
NEUTROPHILS # BLD AUTO: 9.01 X10(3) UL (ref 1.3–6.7)
NEUTROPHILS NFR BLD AUTO: 74.8 %
PLATELET # BLD AUTO: 250 10(3)UL (ref 150–450)
POTASSIUM SERPL-SCNC: 3.3 MMOL/L (ref 3.6–5.1)
POTASSIUM SERPL-SCNC: 4.3 MMOL/L (ref 3.6–5.1)
RBC # BLD AUTO: 4 X10(6)UL (ref 3.8–5.1)
RED CELL DISTRIBUTION WIDTH-SD: 47.2 FL (ref 35.1–46.3)
SODIUM SERPL-SCNC: 139 MMOL/L (ref 136–144)
WBC # BLD AUTO: 12 X10(3) UL (ref 4–13)

## 2017-03-11 PROCEDURE — 71010 XR CHEST AP PORTABLE  (CPT=71010): CPT

## 2017-03-11 PROCEDURE — 84132 ASSAY OF SERUM POTASSIUM: CPT | Performed by: HOSPITALIST

## 2017-03-11 PROCEDURE — 80048 BASIC METABOLIC PNL TOTAL CA: CPT | Performed by: HOSPITALIST

## 2017-03-11 PROCEDURE — 85025 COMPLETE CBC W/AUTO DIFF WBC: CPT | Performed by: HOSPITALIST

## 2017-03-11 RX ORDER — HYDROCODONE BITARTRATE AND ACETAMINOPHEN 5; 325 MG/1; MG/1
1 TABLET ORAL EVERY 4 HOURS PRN
Status: DISCONTINUED | OUTPATIENT
Start: 2017-03-11 | End: 2017-03-12

## 2017-03-11 RX ORDER — HYDROCODONE BITARTRATE AND ACETAMINOPHEN 5; 325 MG/1; MG/1
2 TABLET ORAL EVERY 4 HOURS PRN
Status: DISCONTINUED | OUTPATIENT
Start: 2017-03-11 | End: 2017-03-12

## 2017-03-11 RX ORDER — POTASSIUM CHLORIDE 20 MEQ/1
40 TABLET, EXTENDED RELEASE ORAL EVERY 4 HOURS
Status: COMPLETED | OUTPATIENT
Start: 2017-03-11 | End: 2017-03-11

## 2017-03-11 NOTE — PROGRESS NOTES
[facility] Hospitalist Progress Note     PCP: Oxana Plata MD    SUBJECTIVE:  No CP, SOB, or N/V.    OBJECTIVE:  Temp:  [97.8 °F (36.6 °C)-98.2 °F (36.8 °C)] 97.8 °F (36.6 °C)  Pulse:  [56-83] 66  Resp:  [18] 18  BP: (120-155)/(58-84) 143/68 mmHg    Intake/ admitted for VATS.     Recurrent malignant R pleural effusion  -s/p VATS per Dr. Jose Cobb  -chest tube dced    Nausea - resolved  -possibly related to fentanyl which was dc'd  -anti-emetics prn    CAD, diastolic dysfunction, htn, HL  -pt refuses meds  -joey

## 2017-03-11 NOTE — HOME CARE LIAISON
Received referral for [facility]. Met with patient who is agreeable to [facility] only if there is no copay. Agency brochure provided to patient. Referral sent to [facility] via [place]. [facility] has accepted pt and will provide skilled nurse.       Add: Benefit inf

## 2017-03-11 NOTE — PROGRESS NOTES
Acute Pain Service    Continuous Epidural Infusion Follow-up Note    Indication: Post Surgical.    Pain Score:    0/ at rest    0/ with movement    Patient is comfortable.      Side Effects: None    Vital signs:     /68 mmHg  Pulse 66  Temp(Src) 97

## 2017-03-11 NOTE — PROGRESS NOTES
Diane Watkins 431 Patient Status:  Inpatient    1932 MRN [id number]   [facility] 8NE-A Attending Yamel Nolasco, Adrián Montesinos MD   Hosp Day # 3 PCP Trever Duron MD     [address] [place] [address] well.  Has chronic cough which is un appears stated age and cooperative  Lungs: diminished breath sounds bilaterally  Heart: S1, S2 normal, no murmur, click, rub or gallop, regular rate and rhythm  Abdomen: soft, non-tender; bowel sounds normal; no masses,  no organomegaly  Extremities: extre

## 2017-03-11 NOTE — PLAN OF CARE
CARDIOVASCULAR - ADULT    • Maintains optimal cardiac output and hemodynamic stability  Skin, pale, warm and dry. Alert and oriented times 4. VSS. Afebrile. Tele shows SR with occas PVC's.   Progressing    • Absence of cardiac arrhythmias or at baseline  No

## 2017-03-11 NOTE — PROGRESS NOTES
[facility]   [facility] Progress Note    Julián Malik Patient Status:  Inpatient    1932 MRN [id number]   [facility] 8NE-A Attending Yamel Nolasco, Jaylin Cancer, MD   Hosp Day # 3 PCP Annie Rose MD     Subjective:  Sitting up in scooter Intravenous Continuous   influenza vaccine (PF)(FLUZONE) high dose for 65 yrs & older nj 0.5ml 0.5 mL Intramuscular Prior to discharge   nitroGLYCERIN infusion 50mg in D5W 250ml 5-400 mcg/min Intravenous Continuous       Labs:    Lab Results  Component Elizabeth Pleural effusion, right     YOSVANY (obstructive sleep apnea)     Malignant pleural mesothelioma (HCC)     Coronary artery calcification     Mesothelioma ([facility]     At risk for falling      POD# 3 S/p Rt VATS  - HD stable  - pain well controlled with PCEA, sands

## 2017-03-12 VITALS
RESPIRATION RATE: 20 BRPM | SYSTOLIC BLOOD PRESSURE: 130 MMHG | BODY MASS INDEX: 26.98 KG/M2 | OXYGEN SATURATION: 96 % | WEIGHT: 146.63 LBS | DIASTOLIC BLOOD PRESSURE: 68 MMHG | TEMPERATURE: 98 F | HEART RATE: 73 BPM | HEIGHT: 62 IN

## 2017-03-12 LAB — BLOOD TYPE BARCODE: 5100

## 2017-03-12 RX ORDER — ACETAMINOPHEN 325 MG/1
325 TABLET ORAL EVERY 4 HOURS PRN
Status: DISCONTINUED | OUTPATIENT
Start: 2017-03-12 | End: 2017-03-12

## 2017-03-12 RX ORDER — ACETAMINOPHEN 325 MG/1
650 TABLET ORAL EVERY 4 HOURS PRN
Status: DISCONTINUED | OUTPATIENT
Start: 2017-03-12 | End: 2017-03-12

## 2017-03-12 NOTE — CM/SW NOTE
03/12/17 1500   Discharge disposition   Discharged to: Home-Health   Name of Facillity/Home Care/Hospice Residential

## 2017-03-12 NOTE — PLAN OF CARE
POD #4 VATs (Right) by Dr. Rosado Sensor  Comments:   Pt is A&OX4 ([address] and wears darkened sunglasses), VSS on 2L/NC (removed this AM to assess oxygenation status for anticipated d/c today) and maintaining NSR on telemetry.   Denies any chest pain, chest pressure threatening arrhythmias  - Monitor electrolytes and administer replacement therapy as ordered   Outcome: Adequate for Discharge    Problem: RESPIRATORY - ADULT  Goal: Achieves optimal ventilation and oxygenation  INTERVENTIONS:  - Assess for changes in res 03/12/17    Problem: Impaired Functional Mobility  Goal: Achieve highest/safest level of mobility/gait  Interventions:  - Assess patient’s functional ability and stability  - Promote increasing activity/tolerance for mobility and gait  - Educate and engage

## 2017-03-12 NOTE — PROGRESS NOTES
Tele & iv site removed. Discharge paperwork, education, follow-up care & appointments, as well as new Rx reviewed with pt & son. All questions answered. Transported via [employer] to [employer]; driven home by son.

## 2017-03-12 NOTE — PROGRESS NOTES
[facility] Hospitalist Progress Note     PCP: Kimberlyn Baltazar MD    SUBJECTIVE:  No CP, SOB, or N/V.    OBJECTIVE:  Temp:  [97.5 °F (36.4 °C)-98 °F (36.7 °C)] 98 °F (36.7 °C)  Pulse:  [63-78] 73  Resp:  [18-20] 20  BP: (115-171)/(44-81) 130/68 mmHg    Intake/O VATS.    Recurrent malignant R pleural effusion  -s/p VATS per Dr. Libertad Orantes  -chest tube dced    Nausea - resolved  -possibly related to fentanyl which was dc'd  -anti-emetics prn    CAD, diastolic dysfunction, htn, HL  -pt refuses meds  -monitor on tele

## 2017-03-12 NOTE — PLAN OF CARE
Problem: CARDIOVASCULAR - ADULT  Goal: Maintains optimal cardiac output and hemodynamic stability  INTERVENTIONS:  - Monitor vital signs, rhythm, and trends  - Monitor for bleeding, hypotension and signs of decreased cardiac output  - Evaluate effectivenes for areas of redness and/or skin breakdown  - Initiate interventions, skin care algorithm/standards of care as needed   Outcome: Progressing  Goal: Incision(s), wounds(s) or drain site(s) healing without S/S of infection  INTERVENTIONS:  - Assess and docum REACH.

## 2017-03-12 NOTE — PROGRESS NOTES
Oxygen Saturation Study While Ambulatin/12/17 1015   Mobility   Activity Ambulate in kessler   Level of Assistance Standby assist, set-up cues, supervision of patient - no hands on   Assistive Device Cane   Distance Ambulated (ft) 200 ft   Ambulation

## 2017-03-12 NOTE — PROGRESS NOTES
[facility]   [facility] Progress Note    Trisha Render Patient Status:  Inpatient    1932 MRN [id number]   [facility] 8NE-A Attending Yamel Nolasco, Cristopher Hopper MD   [facility] Day # 4 PCP Cale Cheema MD     Subjective:  Cayla Cobian to go carlos suppository 10 mg 10 mg Rectal Daily PRN   lactated ringers infusion  Intravenous Continuous   influenza vaccine (PF)(FLUZONE) high dose for 65 yrs & older nj 0.5ml 0.5 mL Intramuscular Prior to discharge   nitroGLYCERIN infusion 50mg in D5W 250ml 5-400 mc sleep apnea)     Malignant pleural mesothelioma ([facility] [address]     Coronary artery calcification     Mesothelioma ([facility] [address]     At risk for falling      POD# 4 S/p Rt VATS  - HD stable  - denies pain.     - Encourage deep breathing, coughing, I.S.  - Increase activity.

## 2017-03-17 NOTE — CDS QUERY
Clarification – Procedure Documentation   Anay   Dear Doctor:  Clinical information in the patient's medical record (provided below) includes documentation involving a procedure without a clear cause-and-effect relations MEDICAL RECORD

## 2017-03-20 NOTE — DISCHARGE SUMMARY
[facility]    PATIENT'S NAME: Dearmatty NARAYANAN   ATTENDING PHYSICIAN: Umer Coyle M.D.    PATIENT ACCOUNT#:   [de-identified]    LOCATION:  [address] [place] [address]  MEDICAL RECORD #:   [id number]       YOB: 1932  ADMISSION DATE:       03/08/20

## 2017-03-27 ENCOUNTER — HOSPITAL ENCOUNTER (OUTPATIENT)
Dept: GENERAL RADIOLOGY | Facility: HOSPITAL | Age: 82
Discharge: HOME | End: 2017-03-27
Attending: INTERNAL MEDICINE
Payer: MEDICARE

## 2017-03-27 DIAGNOSIS — Z09 FOLLOW UP: ICD-10-CM

## 2017-03-27 PROCEDURE — 71020 XR CHEST PA + LAT CHEST (CPT=71020): CPT

## 2017-03-27 NOTE — PROGRESS NOTES
Quick Note:    Post-op CXR stable, no recurrence of effusion which is good.  No change in plan.  ______

## 2017-03-27 NOTE — PROGRESS NOTES
Quick Note:    Can see me in follow-up if she/family wants, otherwise ok to cancel if they are pursing palliative care/hospice options with PCP.  ______

## 2017-03-28 NOTE — PROGRESS NOTES
Quick Note:    Per VB, do not need to contact regarding results - pt will be pursing hospice.  ______

## 2017-05-11 ENCOUNTER — APPOINTMENT (OUTPATIENT)
Dept: CT IMAGING | Facility: HOSPITAL | Age: 82
End: 2017-05-11
Attending: EMERGENCY MEDICINE
Payer: MEDICARE

## 2017-05-11 ENCOUNTER — HOSPITAL ENCOUNTER (EMERGENCY)
Facility: HOSPITAL | Age: 82
Discharge: HOME | End: 2017-05-11
Attending: EMERGENCY MEDICINE
Payer: MEDICARE

## 2017-05-11 VITALS
BODY MASS INDEX: 23.92 KG/M2 | HEART RATE: 60 BPM | WEIGHT: 135 LBS | HEIGHT: 63 IN | DIASTOLIC BLOOD PRESSURE: 66 MMHG | RESPIRATION RATE: 18 BRPM | OXYGEN SATURATION: 98 % | SYSTOLIC BLOOD PRESSURE: 176 MMHG | TEMPERATURE: 98 F

## 2017-05-11 DIAGNOSIS — S22.20XA CLOSED FRACTURE OF STERNUM, UNSPECIFIED PORTION OF STERNUM, INITIAL ENCOUNTER: Primary | ICD-10-CM

## 2017-05-11 PROCEDURE — 99285 EMERGENCY DEPT VISIT HI MDM: CPT

## 2017-05-11 PROCEDURE — 84484 ASSAY OF TROPONIN QUANT: CPT

## 2017-05-11 PROCEDURE — 71250 CT THORAX DX C-: CPT | Performed by: EMERGENCY MEDICINE

## 2017-05-11 PROCEDURE — 93010 ELECTROCARDIOGRAM REPORT: CPT | Performed by: EMERGENCY MEDICINE

## 2017-05-11 PROCEDURE — 85025 COMPLETE CBC W/AUTO DIFF WBC: CPT

## 2017-05-11 PROCEDURE — 36415 COLL VENOUS BLD VENIPUNCTURE: CPT

## 2017-05-11 PROCEDURE — 93005 ELECTROCARDIOGRAM TRACING: CPT

## 2017-05-11 PROCEDURE — 80048 BASIC METABOLIC PNL TOTAL CA: CPT

## 2017-05-11 NOTE — ED NOTES
Patient complains of chest pain from falling into a table 5 days ago, denies loc, denies hitting head, patient states it is tender to palpation, no apparent distress noted at this time

## 2017-05-11 NOTE — ED PROVIDER NOTES
Patient Seen in: [facility] Emergency Department    History   Patient presents with:  Trauma (cardiovascular, musculoskeletal)    Stated Complaint: pt fell on sunday, + fracture sterum sent here by own FMD for cardio echo state*    HPI    85-year- Comment small hiatal hernia; schztzki ring    COLONOSCOPY  june 2011    Comment diverticulosis; sigmoid polyp    UPPER GI ENDOSCOPY,DIAGNOSIS  6/20/2011    Comment Performed by Sierra Reyes at [place] [address]  6/20/2011 BMI 23.92 kg/m2  SpO2 98%        Physical Exam   Constitutional: She is oriented to person, place, and time. She appears well-developed and well-nourished. No distress. HENT:   Head: Normocephalic and atraumatic.    Mouth/Throat: Oropharynx is clear and m findings discussed with patient including need for follow up        ED Course     Labs Reviewed   BASIC METABOLIC PANEL (8) - Abnormal; Notable for the following:     Glucose 116 (*)     GFR, Non- 49 (*)     GFR, -American 59 (*) EKG, no signs of neuro or vascular compromise.   I spoke with Dr. Mijares Ards, patient does have a small hematoma, no signs of vascular injury, though this is limited given the patient cannot tolerate contrast.  Given the patient is 4 days out from initial injur

## 2017-05-16 PROBLEM — M35.00 SICCA SYNDROME (HCC): Status: ACTIVE | Noted: 2017-05-16

## 2017-05-16 PROBLEM — S22.20XD: Status: ACTIVE | Noted: 2017-05-16

## 2017-08-02 PROBLEM — S22.20XD: Status: RESOLVED | Noted: 2017-05-16 | Resolved: 2017-08-02

## 2017-11-07 PROBLEM — S72.012A CLOSED SUBCAPITAL FRACTURE OF LEFT FEMUR, INITIAL ENCOUNTER (HCC): Status: ACTIVE | Noted: 2017-11-07

## 2018-04-10 PROBLEM — S72.012A CLOSED SUBCAPITAL FRACTURE OF LEFT FEMUR, INITIAL ENCOUNTER (HCC): Status: RESOLVED | Noted: 2017-11-07 | Resolved: 2018-04-10

## 2018-04-10 PROBLEM — J84.9 ILD (INTERSTITIAL LUNG DISEASE) (HCC): Status: ACTIVE | Noted: 2018-04-10

## 2018-04-14 PROBLEM — M47.816 LUMBAR FACET ARTHROPATHY: Status: ACTIVE | Noted: 2017-01-26

## 2018-04-14 PROBLEM — I70.0 THORACIC AORTA ATHEROSCLEROSIS (HCC): Status: ACTIVE | Noted: 2017-01-26

## 2018-04-14 PROBLEM — M95.2: Status: ACTIVE | Noted: 2017-02-03

## 2019-02-20 ENCOUNTER — DIAGNOSTIC TRANS (OUTPATIENT)
Dept: OTHER | Age: 84
End: 2019-02-20

## 2019-02-20 LAB
ANALYZER ANC (IANC): ABNORMAL
ANION GAP SERPL CALC-SCNC: 13 MMOL/L (ref 10–20)
BASOPHILS # BLD: 0.1 THOUSAND/MCL (ref 0–0.3)
BASOPHILS NFR BLD: 0 %
BUN SERPL-MCNC: 17 MG/DL (ref 6–20)
BUN/CREAT SERPL: 24 (ref 7–25)
CALCIUM SERPL-MCNC: 10.2 MG/DL (ref 8.4–10.2)
CHLORIDE: 96 MMOL/L (ref 98–107)
CO2 SERPL-SCNC: 35 MMOL/L (ref 21–32)
CREAT SERPL-MCNC: 0.72 MG/DL (ref 0.51–0.95)
D DIMER PPP FEU-MCNC: 0.7 MG/L FEU
DIFFERENTIAL METHOD BLD: ABNORMAL
EOSINOPHIL # BLD: 0.1 THOUSAND/MCL (ref 0.1–0.5)
EOSINOPHIL NFR BLD: 1 %
ERYTHROCYTE [DISTWIDTH] IN BLOOD: 13.1 % (ref 11–15)
GLUCOSE SERPL-MCNC: 98 MG/DL (ref 65–99)
HEMATOCRIT: 43.5 % (ref 36–46.5)
HGB BLD-MCNC: 13.9 GM/DL (ref 12–15.5)
IMM GRANULOCYTES # BLD AUTO: 0.1 THOUSAND/MCL (ref 0–0.2)
IMM GRANULOCYTES NFR BLD: 1 %
LYMPHOCYTES # BLD: 2.8 THOUSAND/MCL (ref 1–4)
LYMPHOCYTES NFR BLD: 22 %
MCH RBC QN AUTO: 29.8 PG (ref 26–34)
MCHC RBC AUTO-ENTMCNC: 32 GM/DL (ref 32–36.5)
MCV RBC AUTO: 93.3 FL (ref 78–100)
MONOCYTES # BLD: 0.7 THOUSAND/MCL (ref 0.3–0.9)
MONOCYTES NFR BLD: 6 %
NEUTROPHILS # BLD: 8.8 THOUSAND/MCL (ref 1.8–7.7)
NEUTROPHILS NFR BLD: 70 %
NEUTS SEG NFR BLD: ABNORMAL %
NRBC (NRBCRE): 0 /100 WBC
PLATELET # BLD: 382 THOUSAND/MCL (ref 140–450)
POTASSIUM SERPL-SCNC: 3.5 MMOL/L (ref 3.4–5.1)
RBC # BLD: 4.66 MILLION/MCL (ref 4–5.2)
SODIUM SERPL-SCNC: 140 MMOL/L (ref 135–145)
TROPONIN I SERPL HS-MCNC: <0.02 NG/ML
WBC # BLD: 12.5 THOUSAND/MCL (ref 4.2–11)

## 2019-02-21 ENCOUNTER — HOSPITAL (OUTPATIENT)
Dept: OTHER | Age: 84
End: 2019-02-21
Attending: HOSPITALIST

## 2019-02-21 ENCOUNTER — HOSPITAL (OUTPATIENT)
Dept: OTHER | Age: 84
End: 2019-02-21

## 2019-02-21 LAB
ALBUMIN SERPL-MCNC: 2.8 GM/DL (ref 3.6–5.1)
ALBUMIN/GLOB SERPL: 0.7 {RATIO} (ref 1–2.4)
ALP SERPL-CCNC: 80 UNIT/L (ref 45–117)
ALT SERPL-CCNC: 18 UNIT/L
ANALYZER ANC (IANC): ABNORMAL
ANION GAP SERPL CALC-SCNC: 12 MMOL/L (ref 10–20)
AST SERPL-CCNC: 27 UNIT/L
BASE DEFICIT BLDA-SCNC: ABNORMAL MMOL/L
BASE EXCESS BLDA CALC-SCNC: 10 MMOL/L (ref 0–3)
BASOPHILS # BLD: 0 THOUSAND/MCL (ref 0–0.3)
BASOPHILS NFR BLD: 0 %
BDY SITE: ABNORMAL
BILIRUB SERPL-MCNC: 0.2 MG/DL (ref 0.2–1)
BODY TEMPERATURE: 37 DEGREES
BUN SERPL-MCNC: 17 MG/DL (ref 6–20)
BUN/CREAT SERPL: 23 (ref 7–25)
CA-I BLDA-SCNC: 14 % (ref 15–23)
CALCIUM SERPL-MCNC: 9.2 MG/DL (ref 8.4–10.2)
CHLORIDE: 101 MMOL/L (ref 98–107)
CO2 SERPL-SCNC: 34 MMOL/L (ref 21–32)
COHGB MFR BLD: 1.8 %
CONDITION: ABNORMAL
CONDITION: ABNORMAL
CREAT SERPL-MCNC: 0.75 MG/DL (ref 0.51–0.95)
DIFFERENTIAL METHOD BLD: ABNORMAL
EOSINOPHIL # BLD: 0.2 THOUSAND/MCL (ref 0.1–0.5)
EOSINOPHIL NFR BLD: 2 %
ERYTHROCYTE [DISTWIDTH] IN BLOOD: 13.2 % (ref 11–15)
GLOBULIN SER-MCNC: 4.1 GM/DL (ref 2–4)
GLUCOSE SERPL-MCNC: 94 MG/DL (ref 65–99)
HCO3 BLDA-SCNC: 35 MMOL/L (ref 22–28)
HCT VFR BLD CALC: 36 % (ref 36–46.5)
HEMATOCRIT: 37.9 % (ref 36–46.5)
HGB BLD-MCNC: 12 GM/DL (ref 12–15.5)
HGB BLD-MCNC: 12.1 GM/DL (ref 12–15.5)
HOROWITZ INDEX BLD+IHG-RTO: ABNORMAL MM[HG]
IMM GRANULOCYTES # BLD AUTO: 0 THOUSAND/MCL (ref 0–0.2)
IMM GRANULOCYTES NFR BLD: 0 %
INR PPP: 0.9
LDH SERPL-CCNC: 116 UNIT/L (ref 82–240)
LYMPHOCYTES # BLD: 2.5 THOUSAND/MCL (ref 1–4)
LYMPHOCYTES NFR BLD: 24 %
MAGNESIUM SERPL-MCNC: 1.9 MG/DL (ref 1.7–2.4)
MCH RBC QN AUTO: 29.7 PG (ref 26–34)
MCHC RBC AUTO-ENTMCNC: 31.9 GM/DL (ref 32–36.5)
MCV RBC AUTO: 93.1 FL (ref 78–100)
METHGB MFR BLD: 1 %
MONOCYTES # BLD: 0.8 THOUSAND/MCL (ref 0.3–0.9)
MONOCYTES NFR BLD: 8 %
NEUTROPHILS # BLD: 7 THOUSAND/MCL (ref 1.8–7.7)
NEUTROPHILS NFR BLD: 66 %
NEUTS SEG NFR BLD: ABNORMAL %
NRBC (NRBCRE): 0 /100 WBC
OXYHGB MFR BLD: 86.2 % (ref 94–98)
PAO2 / FIO2 RATIO (RPFR): ABNORMAL
PCO2 BLDA: 51 MM HG (ref 32–45)
PH BLDA: 7.45 UNIT (ref 7.35–7.45)
PLATELET # BLD: 345 THOUSAND/MCL (ref 140–450)
PO2 BLDA: 52 MM HG (ref 83–108)
POTASSIUM SERPL-SCNC: 3.9 MMOL/L (ref 3.4–5.1)
PROT SERPL-MCNC: 6.9 GM/DL (ref 6.4–8.2)
PROTHROMBIN TIME: 9.7 SECONDS (ref 9.7–11.8)
PROTHROMBIN TIME: NORMAL
RBC # BLD: 4.07 MILLION/MCL (ref 4–5.2)
SAO2 % BLDA: 89 % (ref 95–99)
SODIUM SERPL-SCNC: 143 MMOL/L (ref 135–145)
WBC # BLD: 10.6 THOUSAND/MCL (ref 4.2–11)

## 2019-05-06 ENCOUNTER — HOSPITAL (OUTPATIENT)
Dept: OTHER | Age: 84
End: 2019-05-06
Attending: EMERGENCY MEDICINE

## 2019-05-06 ENCOUNTER — DIAGNOSTIC TRANS (OUTPATIENT)
Dept: OTHER | Age: 84
End: 2019-05-06

## 2019-05-07 ENCOUNTER — HOSPITAL (OUTPATIENT)
Dept: OTHER | Age: 84
End: 2019-05-07

## 2019-05-07 LAB
ANALYZER ANC (IANC): ABNORMAL
ANION GAP SERPL CALC-SCNC: 10 MMOL/L (ref 10–20)
APTT PPP: 25 SECONDS (ref 22–32)
APTT PPP: NORMAL S
BASOPHILS # BLD: 0.1 THOUSAND/MCL (ref 0–0.3)
BASOPHILS NFR BLD: 0 %
BUN SERPL-MCNC: 26 MG/DL (ref 6–20)
BUN/CREAT SERPL: 26 (ref 7–25)
CALCIUM SERPL-MCNC: 9 MG/DL (ref 8.4–10.2)
CHLORIDE: 97 MMOL/L (ref 98–107)
CO2 SERPL-SCNC: 33 MMOL/L (ref 21–32)
CREAT SERPL-MCNC: 1 MG/DL (ref 0.51–0.95)
D DIMER PPP FEU-MCNC: 0.66 MG/L FEU
DIFFERENTIAL METHOD BLD: ABNORMAL
EOSINOPHIL # BLD: 0.1 THOUSAND/MCL (ref 0.1–0.5)
EOSINOPHIL NFR BLD: 1 %
ERYTHROCYTE [DISTWIDTH] IN BLOOD: 13.8 % (ref 11–15)
GLUCOSE SERPL-MCNC: 107 MG/DL (ref 65–99)
HEMATOCRIT: 40.2 % (ref 36–46.5)
HGB BLD-MCNC: 13 GM/DL (ref 12–15.5)
IMM GRANULOCYTES # BLD AUTO: 0.1 THOUSAND/MCL (ref 0–0.2)
IMM GRANULOCYTES NFR BLD: 1 %
INR PPP: 0.9
LYMPHOCYTES # BLD: 2.4 THOUSAND/MCL (ref 1–4)
LYMPHOCYTES NFR BLD: 19 %
MCH RBC QN AUTO: 29.5 PG (ref 26–34)
MCHC RBC AUTO-ENTMCNC: 32.3 GM/DL (ref 32–36.5)
MCV RBC AUTO: 91.4 FL (ref 78–100)
MONOCYTES # BLD: 0.7 THOUSAND/MCL (ref 0.3–0.9)
MONOCYTES NFR BLD: 5 %
NEUTROPHILS # BLD: 9.8 THOUSAND/MCL (ref 1.8–7.7)
NEUTROPHILS NFR BLD: 74 %
NEUTS SEG NFR BLD: ABNORMAL %
NRBC (NRBCRE): 0 /100 WBC
PLATELET # BLD: 373 THOUSAND/MCL (ref 140–450)
POTASSIUM SERPL-SCNC: 3.8 MMOL/L (ref 3.4–5.1)
PROTHROMBIN TIME: 9.2 SECONDS (ref 9.7–11.8)
PROTHROMBIN TIME: ABNORMAL
RBC # BLD: 4.4 MILLION/MCL (ref 4–5.2)
SODIUM SERPL-SCNC: 136 MMOL/L (ref 135–145)
TROPONIN I SERPL HS-MCNC: 0.85 NG/ML
WBC # BLD: 13.2 THOUSAND/MCL (ref 4.2–11)

## 2019-06-07 PROBLEM — I31.3 PERICARDIAL EFFUSION WITHOUT CARDIAC TAMPONADE: Status: ACTIVE | Noted: 2019-06-07

## 2019-08-01 PROBLEM — I20.0 UNSTABLE ANGINA (HCC): Status: ACTIVE | Noted: 2019-08-01

## 2019-11-26 ENCOUNTER — HOSPITAL (OUTPATIENT)
Dept: OTHER | Age: 84
End: 2019-11-26

## 2020-01-01 ENCOUNTER — HOSPITAL ENCOUNTER (OUTPATIENT)
Dept: INTERVENTIONAL RADIOLOGY/VASCULAR | Facility: HOSPITAL | Age: 85
Discharge: HOME | End: 2020-01-01
Attending: INTERNAL MEDICINE | Admitting: RADIOLOGY
Payer: MEDICARE

## 2020-01-01 VITALS
OXYGEN SATURATION: 93 % | WEIGHT: 80 LBS | RESPIRATION RATE: 25 BRPM | BODY MASS INDEX: 15 KG/M2 | SYSTOLIC BLOOD PRESSURE: 125 MMHG | DIASTOLIC BLOOD PRESSURE: 70 MMHG | HEART RATE: 82 BPM

## 2020-01-01 DIAGNOSIS — J90 PLEURAL EFFUSION: ICD-10-CM

## 2020-01-01 LAB — SARS-COV-2 RNA RESP QL NAA+PROBE: NOT DETECTED

## 2020-01-01 PROCEDURE — 0WP930Z REMOVAL OF DRAINAGE DEVICE FROM RIGHT PLEURAL CAVITY, PERCUTANEOUS APPROACH: ICD-10-PCS | Performed by: RADIOLOGY

## 2020-01-01 PROCEDURE — 32552 REMOVE LUNG CATHETER: CPT

## 2020-01-01 PROCEDURE — 36415 COLL VENOUS BLD VENIPUNCTURE: CPT

## 2020-01-01 RX ORDER — LIDOCAINE HYDROCHLORIDE 20 MG/ML
INJECTION, SOLUTION EPIDURAL; INFILTRATION; INTRACAUDAL; PERINEURAL
Status: COMPLETED
Start: 2020-01-01 | End: 2020-01-01

## 2020-01-01 RX ORDER — LORAZEPAM 2 MG/ML
1 CONCENTRATE ORAL EVERY 4 HOURS PRN
COMMUNITY

## 2020-01-01 RX ORDER — ACETAMINOPHEN 650 MG/1
650 SUPPOSITORY RECTAL EVERY 4 HOURS PRN
COMMUNITY

## 2020-01-01 RX ORDER — MORPHINE SULFATE 20 MG/ML
5 SOLUTION ORAL
COMMUNITY

## 2020-01-01 RX ORDER — SODIUM CHLORIDE 9 MG/ML
INJECTION, SOLUTION INTRAVENOUS CONTINUOUS
Status: DISCONTINUED | OUTPATIENT
Start: 2020-01-01 | End: 2020-01-01

## 2020-01-01 RX ORDER — ATROPINE SULFATE 10 MG/ML
2 SOLUTION/ DROPS OPHTHALMIC AS NEEDED
COMMUNITY

## 2020-01-01 RX ORDER — BISACODYL 10 MG
10 SUPPOSITORY, RECTAL RECTAL AS NEEDED
COMMUNITY

## 2020-01-01 RX ORDER — HALOPERIDOL 2 MG/ML
1 SOLUTION ORAL EVERY 4 HOURS PRN
COMMUNITY

## 2020-01-01 RX ORDER — PROCHLORPERAZINE MALEATE 10 MG
10 TABLET ORAL EVERY 6 HOURS PRN
COMMUNITY

## 2020-01-01 RX ADMIN — SODIUM CHLORIDE: 9 INJECTION, SOLUTION INTRAVENOUS at 08:45:00

## 2020-02-20 PROBLEM — H91.90 HEARING DISORDER: Status: ACTIVE | Noted: 2020-01-01

## 2020-02-21 ENCOUNTER — TELEPHONE (OUTPATIENT)
Dept: PREADMISSION TESTING | Age: 85
End: 2020-02-21

## 2020-02-21 ENCOUNTER — IMAGING SERVICES (OUTPATIENT)
Dept: OTHER | Age: 85
End: 2020-02-21

## 2020-02-21 VITALS — HEIGHT: 62 IN | WEIGHT: 92 LBS | BODY MASS INDEX: 16.93 KG/M2

## 2020-02-21 ASSESSMENT — ACTIVITIES OF DAILY LIVING (ADL)
ADL_BEFORE_ADMISSION: INDEPENDENT
ADL_SCORE: 12

## 2020-02-24 ENCOUNTER — HOSPITAL ENCOUNTER (OUTPATIENT)
Dept: INTERVENTIONAL RADIOLOGY/VASCULAR | Age: 85
Discharge: HOME | End: 2020-02-24
Attending: INTERNAL MEDICINE

## 2020-02-24 ENCOUNTER — HOSPITAL ENCOUNTER (OUTPATIENT)
Dept: GENERAL RADIOLOGY | Age: 85
Discharge: HOME | End: 2020-02-24
Attending: RADIOLOGY

## 2020-02-24 VITALS
SYSTOLIC BLOOD PRESSURE: 114 MMHG | OXYGEN SATURATION: 94 % | HEART RATE: 74 BPM | TEMPERATURE: 98.1 F | RESPIRATION RATE: 18 BRPM | WEIGHT: 97 LBS | BODY MASS INDEX: 17.85 KG/M2 | DIASTOLIC BLOOD PRESSURE: 63 MMHG | HEIGHT: 62 IN

## 2020-02-24 DIAGNOSIS — J90 RECURRENT RIGHT PLEURAL EFFUSION: ICD-10-CM

## 2020-02-24 LAB
ALBUMIN SERPL-MCNC: 2.8 G/DL (ref 3.6–5.1)
ALP SERPL-CCNC: 74 UNITS/L (ref 45–117)
ALT SERPL-CCNC: 14 UNITS/L
ANION GAP SERPL CALC-SCNC: 11 MMOL/L (ref 10–20)
AST SERPL-CCNC: 18 UNITS/L
BASOPHILS # BLD AUTO: 0 K/MCL (ref 0–0.3)
BASOPHILS NFR BLD AUTO: 0 %
BILIRUB CONJ SERPL-MCNC: <0.1 MG/DL (ref 0–0.2)
BILIRUB SERPL-MCNC: 0.3 MG/DL (ref 0.2–1)
BUN SERPL-MCNC: 31 MG/DL (ref 6–20)
BUN/CREAT SERPL: 23 (ref 7–25)
CALCIUM SERPL-MCNC: 10.4 MG/DL (ref 8.4–10.2)
CHLORIDE SERPL-SCNC: 100 MMOL/L (ref 98–107)
CO2 SERPL-SCNC: 31 MMOL/L (ref 21–32)
CREAT SERPL-MCNC: 1.37 MG/DL (ref 0.51–0.95)
DIFFERENTIAL METHOD BLD: ABNORMAL
EOSINOPHIL # BLD AUTO: 0.1 K/MCL (ref 0.1–0.5)
EOSINOPHIL NFR SPEC: 1 %
ERYTHROCYTE [DISTWIDTH] IN BLOOD: 14 % (ref 11–15)
GLUCOSE SERPL-MCNC: 103 MG/DL (ref 65–99)
HCT VFR BLD CALC: 34.7 % (ref 36–46.5)
HGB BLD-MCNC: 11.2 G/DL (ref 12–15.5)
IMM GRANULOCYTES # BLD AUTO: 0.1 K/MCL (ref 0–0.2)
IMM GRANULOCYTES NFR BLD: 1 %
INR PPP: 0.9
LYMPHOCYTES # BLD MANUAL: 1.2 K/MCL (ref 1–4)
LYMPHOCYTES NFR BLD MANUAL: 12 %
MCH RBC QN AUTO: 29.6 PG (ref 26–34)
MCHC RBC AUTO-ENTMCNC: 32.3 G/DL (ref 32–36.5)
MCV RBC AUTO: 91.8 FL (ref 78–100)
MONOCYTES # BLD MANUAL: 0.6 K/MCL (ref 0.3–0.9)
MONOCYTES NFR BLD MANUAL: 6 %
NEUTROPHILS # BLD: 8.3 K/MCL (ref 1.8–7.7)
NEUTROPHILS NFR BLD AUTO: 80 %
NRBC BLD MANUAL-RTO: 0 /100 WBC
PLATELET # BLD: 416 K/MCL (ref 140–450)
POTASSIUM SERPL-SCNC: 3.6 MMOL/L (ref 3.4–5.1)
PROT SERPL-MCNC: 8.3 G/DL (ref 6.4–8.2)
PROTHROMBIN TIME: 9.8 SEC (ref 9.7–11.8)
RBC # BLD: 3.78 MIL/MCL (ref 4–5.2)
SODIUM SERPL-SCNC: 138 MMOL/L (ref 135–145)
WBC # BLD: 10.2 K/MCL (ref 4.2–11)

## 2020-02-24 PROCEDURE — 85610 PROTHROMBIN TIME: CPT

## 2020-02-24 PROCEDURE — 71045 X-RAY EXAM CHEST 1 VIEW: CPT

## 2020-02-24 PROCEDURE — 10002807 HB RX 258: Performed by: RADIOLOGY

## 2020-02-24 PROCEDURE — 10006023 HB SUPPLY 272

## 2020-02-24 PROCEDURE — 10002800 HB RX 250 W HCPCS: Performed by: RADIOLOGY

## 2020-02-24 PROCEDURE — 85025 COMPLETE CBC W/AUTO DIFF WBC: CPT

## 2020-02-24 PROCEDURE — 80048 BASIC METABOLIC PNL TOTAL CA: CPT

## 2020-02-24 PROCEDURE — 99152 MOD SED SAME PHYS/QHP 5/>YRS: CPT

## 2020-02-24 PROCEDURE — C1894 INTRO/SHEATH, NON-LASER: HCPCS

## 2020-02-24 PROCEDURE — 10002801 HB RX 250 W/O HCPCS: Performed by: RADIOLOGY

## 2020-02-24 PROCEDURE — 80076 HEPATIC FUNCTION PANEL: CPT

## 2020-02-24 PROCEDURE — C1769 GUIDE WIRE: HCPCS

## 2020-02-24 PROCEDURE — 32550 INSERT PLEURAL CATH: CPT

## 2020-02-24 PROCEDURE — 10002805 HB CONTRAST AGENT: Performed by: INTERNAL MEDICINE

## 2020-02-24 PROCEDURE — 13000001 HB PHASE II RECOVERY EA 30 MINUTES

## 2020-02-24 PROCEDURE — C1729 CATH, DRAINAGE: HCPCS

## 2020-02-24 PROCEDURE — 36415 COLL VENOUS BLD VENIPUNCTURE: CPT

## 2020-02-24 RX ORDER — 0.9 % SODIUM CHLORIDE 0.9 %
2 VIAL (ML) INJECTION EVERY 12 HOURS SCHEDULED
Status: DISCONTINUED | OUTPATIENT
Start: 2020-02-24 | End: 2020-02-24

## 2020-02-24 RX ORDER — LIDOCAINE HYDROCHLORIDE 10 MG/ML
INJECTION, SOLUTION INFILTRATION; PERINEURAL PRN
Status: DISCONTINUED | OUTPATIENT
Start: 2020-02-24 | End: 2020-02-26 | Stop reason: HOSPADM

## 2020-02-24 RX ORDER — SODIUM CHLORIDE 9 MG/ML
INJECTION, SOLUTION INTRAVENOUS CONTINUOUS
Status: DISCONTINUED | OUTPATIENT
Start: 2020-02-24 | End: 2020-02-24 | Stop reason: HOSPADM

## 2020-02-24 RX ORDER — SODIUM CHLORIDE 9 MG/ML
INJECTION, SOLUTION INTRAVENOUS
Status: DISCONTINUED
Start: 2020-02-24 | End: 2020-02-24 | Stop reason: HOSPADM

## 2020-02-24 RX ADMIN — FENTANYL CITRATE 50 MCG: 50 INJECTION INTRAMUSCULAR; INTRAVENOUS at 11:02

## 2020-02-24 RX ADMIN — IOHEXOL 100 ML: 350 INJECTION, SOLUTION INTRAVENOUS at 11:19

## 2020-02-24 RX ADMIN — LIDOCAINE HYDROCHLORIDE 10 ML: 10 INJECTION, SOLUTION INFILTRATION; PERINEURAL at 10:55

## 2020-02-24 RX ADMIN — SODIUM CHLORIDE: 9 INJECTION, SOLUTION INTRAVENOUS at 09:37

## 2020-02-24 RX ADMIN — VANCOMYCIN HYDROCHLORIDE 1000 MG: 1 INJECTION, POWDER, LYOPHILIZED, FOR SOLUTION INTRAVENOUS at 09:22

## 2020-02-24 RX ADMIN — FENTANYL CITRATE 50 MCG: 50 INJECTION INTRAMUSCULAR; INTRAVENOUS at 10:51

## 2020-02-24 ASSESSMENT — PAIN SCALES - GENERAL: PAINLEVEL_OUTOF10: 0

## 2020-02-25 ENCOUNTER — ADVANCED DIRECTIVES (OUTPATIENT)
Dept: HEALTH INFORMATION MANAGEMENT | Age: 85
End: 2020-02-25

## 2020-08-03 NOTE — PROCEDURES
[facility]OSP - [facility]  Procedure Note    Sierra Pedroza Patient Status:  Outpatient in a Bed    1932 MRN [id number]   Location [facility] Attending Keri Mixon MD   Hosp Day # 0 PCP Fay Elizabeth MD     Pr

## 2020-08-03 NOTE — IVS NOTE
Site clean and dry with skin glue in place. No bleeding or oozing noted. Patient tolerated well. Denies need for pain medication intervention. Son remains at bedside. Son arranged for ambulance transport back to the home.   Await ambulance arrival.  Socorro Tello

## 2020-08-03 NOTE — INTERVAL H&P NOTE
The above referenced H&P was reviewed by Moe Doan MD on 8/3/2020, the patient was examined and no significant changes have occurred in the patient's condition since the H&P was performed.   Risks, benefits, alternative treatments and consequences

## 2020-08-03 NOTE — PRE-SEDATION ASSESSMENT
[place]  IR Pre-Procedure Sedation Assessment    History of snoring or sleep or apnea?    No    History of previous problems with anesthesia or sedation  No    Physical Findings:  Neck: nl ROM  CV: RRR  PULM: normal respiratory rate/effor

## (undated) DEVICE — TRANSPOSAL ULTRAFLEX DUO/QUAD ULTRA CART MANIFOLD

## (undated) DEVICE — GLOVE SURG TRIUMPH SZ 7

## (undated) DEVICE — GLOVE SURG TRIUMPH SZ 71/2

## (undated) DEVICE — SUTURE SILK 0 FSL

## (undated) DEVICE — 3M™ STERI-DRAPE™ INSTRUMENT POUCH 1018: Brand: STERI-DRAPE™

## (undated) DEVICE — SUTURE VICRYL 2-0 CT-1

## (undated) DEVICE — 3M(TM) TEGADERM(TM) TRANSPARENT FILM DRESSING FRAME STYLE 1628: Brand: 3M™ TEGADERM™

## (undated) DEVICE — CV LAP PACK-LF: Brand: MEDLINE INDUSTRIES, INC.

## (undated) DEVICE — GAUZE SPONGES,USP TYPE VII GAUZE, 12 PLY: Brand: CURITY

## (undated) DEVICE — 3M™ IOBAN™ 2 ANTIMICROBIAL INCISE DRAPE 6650EZ: Brand: IOBAN™ 2

## (undated) DEVICE — DRAIN CHEST DRY ADULT/PED

## (undated) DEVICE — KENDALL SCD EXPRESS SLEEVES, KNEE LENGTH, MEDIUM: Brand: KENDALL SCD

## (undated) DEVICE — SOLUTION SURG DURA PREP HAZMAT

## (undated) DEVICE — SUTURE VICRYL 3-0 PS-1

## (undated) DEVICE — 3M™ TEGADERM™ TRANSPARENT FILM DRESSING, 1626W, 4 IN X 4-3/4 IN (10 CM X 12 CM), 50 EACH/CARTON, 4 CARTON/CASE: Brand: 3M™ TEGADERM™

## (undated) NOTE — LETTER
[facility]  Rolf Luis Aanam 61 [address] [facility], [address] [place] [address]    Consent for Operation    Date: __________________    Time: _______________    1.  I authorize the performance upon Megan Bar the following operation:      RIGHT VIDEO ASSISTED THORACOSCOPIC S procedure has been videotaped, the surgeon will obtain the original videotape. The hospital will not be responsible for storage or maintenance of this tape.     6. For the purpose of advancing medical education, I consent to the admittance of observers to t STATEMENTS REQUIRING INSERTION OR COMPLETION WERE FILLED IN.     Signature of Patient:   ___________________________    When the patient is a minor or mentally incompetent to give consent:  Signature of person authorized to consent for patient: ____________ drugs/illegal medications). Failure to inform my anesthesiologist about these medicines may increase my risk of anesthetic complications. · If I am allergic to anything or have had a reaction to anesthesia before.     3. I understand how the anesthesia med I have discussed the procedure and information above with the patient (or patient’s representative) and answered their questions. The patient or their representative has agreed to have anesthesia services.     _______________________________________________

## (undated) NOTE — IP AVS SNAPSHOT
[facility] [place]  [address] [place], [address] ~ [phone]                Discharge Summary   8/4/0715    Mahamed Pritchard           Admission Information        Provider Department    3/8/2017 Md Alison Diaz MD  8ne-A LUNG SURGERY, UNDERSTANDING (ENGLISH)      Follow-up Information     Follow up with Baltazar Becker MD On 3/14/2017.     Specialties:  Internal Medicine, HOSPITALIST    Why:  a hospital follow up appointment has been made for you at 12 noon    Contact info 12.0 (03/11/17)  4.00 (03/11/17)  12.1 (03/11/17)  37.0 (03/11/17)  92.5 -- -- -- (03/11/17)  250.0 (02/21/17)  10.2    (03/09/17)  19.2 (H) (03/09/17)  4.13 (03/09/17)  12.6 (03/09/17)  38.5 (03/09/17)  93.2    (03/09/17)  241.0     (02/21/17)  9.37 (02/2 - If you have concerns related to behavioral health issues or thoughts of harming yourself, contact [address] [place] [address] at [phone].     - If you don’t have insurance, [facility] has partnered with Patient Lenwood Esteban

## (undated) NOTE — LETTER
[facility] FOR BLOOD OR BLOOD COMPONENTS      In the course of your treatment, it may become necessary to administer a transfusion of blood or blood components.  This form provides basic information concerning this proc explain the alternatives to you if it has not already been done. I,Malika Araiza, have read/had read to me the above. I understand the matters bearing on the decision whether or not to authorize a transfusion of blood or blood components.  I have no questio

## (undated) NOTE — ED AVS SNAPSHOT
[facility] Emergency Department    [place] [address]     [place] [address]    Phone:  [phone]    Fax:  [phone]   MRN: [id number]    Department:  [facility] Emergency Department   Date of Visit:  5/1 please call our  at [phone]. Si tiene problemas para programar jose dilip de seguimiento según lo indicado, llame al encargado de marina al [phone].     It is our goal to assure that you are completely satisfied with every aspect o doctor until you can check with your doctor. Please bring the medication list to your next doctor's appointment. Any imaging studies and labs completed today can be reviewed in your [employer] account.   You may have had testing done that requires us to co and ask to get set up for an insurance coverage that is in-network with [facility].         MyChart     Visit [employer]  You can access your [employer] to more actively manage your health care and view more details from this visit by going to https:/

## (undated) NOTE — LETTER
[address] [place] [address]  Authorization for Surgical Operation or Procedure  Date: ______________       Time: _______________  1.  I hereby authorize Dr. Dolores Urbina , my physician and the assistant, to perform the following operation a 5. I consent to the photographing of the operations or procedures to be performed for the purposes of advancing medicine, science, and/or education, provided my identity is not revealed.  If the procedure has been videotaped, the physician/surgeon will obta risks and benefits involved in the proposed treatment and any reasonable alternative to the proposed treatment. I have also explained the risks and benefits involved in the refusal of the proposed treatment and have answered the patient's questions.  If I h

## (undated) NOTE — LETTER
Pam Alaniz M.D., F.A.C.S. Felisa Vera M.D., F.A.C.S. Anne Marie Levi M.D., [place]. YADIEL Frankel M.D., F.A.C.S. Alex Duffy M.D., F.A.C.S. Yahir Lopes M.D. LILY Ryan M.D., F. chance to have all of your questions and concerns answered. If there are any issues which have not been adequately addressed, we ask you to bring them forward so that we can thoroughly address them.     A patient who is fully informed and understands their treatment, among other options and the risks and benefits of the different treatment options:    Yes _____ No _____    A [facility] surgeon as explained to me that if I should so desire, he/she is willing to explain my case and the surgical and non-surgical optio

## (undated) NOTE — ED AVS SNAPSHOT
[facility] Emergency Department    [place]     [address] [place] [address]    Phone:  [phone]    Fax:  [phone]   MRN: [id number]    Department:  [facility] Emergency Department   Date of Visit:  5/1 and Class Registration line at [phone] or find a doctor online by visiting [email or web address].    IF THERE IS ANY CHANGE OR WORSENING OF YOUR CONDITION, CALL YOUR PRIMARY CARE PHYSICIAN AT ONCE OR RETURN IMMEDIATELY TO [address] [place] [address].     If

## (undated) NOTE — LETTER
[facility], [place]  Authorization for Invasive Procedures  1.  I hereby authorize Dr Alok Bonner , my physician and whomever may be designated as the doctor's assistant, to perform the following operation and/or procedure:  Right C following are some, but not all, of the potential risks that can occur: fever and allergic reactions, hemolytic reactions, transmission of disease such as hepatitis, AIDS, cytomegalovirus (CMV), and flluid overload.  In the event that I wish to have autolog administration of sedation/analgesia as may be necessary or desirable in the judgment of my physician.      Signature of Patient:  ________________________________________________ Date: _________Time: _________    Responsible person in case of minor or unco

## (undated) NOTE — IP AVS SNAPSHOT
[address] [place] [address] [phone]                After Visit Summary   1/31/2017    Pedro Case    MRN: [id number]           Visit Information        Provider Department    1/31/2017  1:30 PM [facility] If you are seeing a provider outside of your own physician group, please show this after visit summary at your next appointment.